# Patient Record
Sex: FEMALE | Race: ASIAN | NOT HISPANIC OR LATINO | URBAN - METROPOLITAN AREA
[De-identification: names, ages, dates, MRNs, and addresses within clinical notes are randomized per-mention and may not be internally consistent; named-entity substitution may affect disease eponyms.]

---

## 2020-07-07 ENCOUNTER — EMERGENCY (EMERGENCY)
Facility: HOSPITAL | Age: 60
LOS: 1 days | Discharge: HOME | End: 2020-07-07
Attending: EMERGENCY MEDICINE
Payer: MEDICAID

## 2020-07-07 VITALS
DIASTOLIC BLOOD PRESSURE: 78 MMHG | SYSTOLIC BLOOD PRESSURE: 136 MMHG | RESPIRATION RATE: 16 BRPM | HEIGHT: 66 IN | TEMPERATURE: 98 F | WEIGHT: 110.01 LBS | HEART RATE: 104 BPM | OXYGEN SATURATION: 97 %

## 2020-07-07 VITALS
DIASTOLIC BLOOD PRESSURE: 86 MMHG | SYSTOLIC BLOOD PRESSURE: 132 MMHG | TEMPERATURE: 99 F | HEART RATE: 99 BPM | OXYGEN SATURATION: 97 % | RESPIRATION RATE: 17 BRPM

## 2020-07-07 LAB
ALBUMIN SERPL ELPH-MCNC: 4.7 G/DL — SIGNIFICANT CHANGE UP (ref 3.3–5)
ALP SERPL-CCNC: 67 U/L — SIGNIFICANT CHANGE UP (ref 40–120)
ALT FLD-CCNC: 22 U/L — SIGNIFICANT CHANGE UP (ref 10–45)
ANION GAP SERPL CALC-SCNC: 14 MMOL/L — SIGNIFICANT CHANGE UP (ref 5–17)
AST SERPL-CCNC: 25 U/L — SIGNIFICANT CHANGE UP (ref 10–40)
BASOPHILS # BLD AUTO: 0.03 K/UL — SIGNIFICANT CHANGE UP (ref 0–0.2)
BASOPHILS NFR BLD AUTO: 0.4 % — SIGNIFICANT CHANGE UP (ref 0–2)
BILIRUB SERPL-MCNC: 0.3 MG/DL — SIGNIFICANT CHANGE UP (ref 0.2–1.2)
BUN SERPL-MCNC: 10 MG/DL — SIGNIFICANT CHANGE UP (ref 7–23)
CALCIUM SERPL-MCNC: 9.8 MG/DL — SIGNIFICANT CHANGE UP (ref 8.4–10.5)
CHLORIDE SERPL-SCNC: 100 MMOL/L — SIGNIFICANT CHANGE UP (ref 96–108)
CO2 SERPL-SCNC: 25 MMOL/L — SIGNIFICANT CHANGE UP (ref 22–31)
CREAT SERPL-MCNC: 0.65 MG/DL — SIGNIFICANT CHANGE UP (ref 0.5–1.3)
EOSINOPHIL # BLD AUTO: 0.01 K/UL — SIGNIFICANT CHANGE UP (ref 0–0.5)
EOSINOPHIL NFR BLD AUTO: 0.1 % — SIGNIFICANT CHANGE UP (ref 0–6)
GLUCOSE SERPL-MCNC: 126 MG/DL — HIGH (ref 70–99)
HCT VFR BLD CALC: 41.5 % — SIGNIFICANT CHANGE UP (ref 34.5–45)
HGB BLD-MCNC: 13.4 G/DL — SIGNIFICANT CHANGE UP (ref 11.5–15.5)
IMM GRANULOCYTES NFR BLD AUTO: 0.4 % — SIGNIFICANT CHANGE UP (ref 0–1.5)
LYMPHOCYTES # BLD AUTO: 1.5 K/UL — SIGNIFICANT CHANGE UP (ref 1–3.3)
LYMPHOCYTES # BLD AUTO: 20.7 % — SIGNIFICANT CHANGE UP (ref 13–44)
MCHC RBC-ENTMCNC: 29.5 PG — SIGNIFICANT CHANGE UP (ref 27–34)
MCHC RBC-ENTMCNC: 32.3 GM/DL — SIGNIFICANT CHANGE UP (ref 32–36)
MCV RBC AUTO: 91.2 FL — SIGNIFICANT CHANGE UP (ref 80–100)
MONOCYTES # BLD AUTO: 0.29 K/UL — SIGNIFICANT CHANGE UP (ref 0–0.9)
MONOCYTES NFR BLD AUTO: 4 % — SIGNIFICANT CHANGE UP (ref 2–14)
NEUTROPHILS # BLD AUTO: 5.38 K/UL — SIGNIFICANT CHANGE UP (ref 1.8–7.4)
NEUTROPHILS NFR BLD AUTO: 74.4 % — SIGNIFICANT CHANGE UP (ref 43–77)
NRBC # BLD: 0 /100 WBCS — SIGNIFICANT CHANGE UP (ref 0–0)
PLATELET # BLD AUTO: 256 K/UL — SIGNIFICANT CHANGE UP (ref 150–400)
POTASSIUM SERPL-MCNC: 3.7 MMOL/L — SIGNIFICANT CHANGE UP (ref 3.5–5.3)
POTASSIUM SERPL-SCNC: 3.7 MMOL/L — SIGNIFICANT CHANGE UP (ref 3.5–5.3)
PROT SERPL-MCNC: 7.9 G/DL — SIGNIFICANT CHANGE UP (ref 6–8.3)
RBC # BLD: 4.55 M/UL — SIGNIFICANT CHANGE UP (ref 3.8–5.2)
RBC # FLD: 12.2 % — SIGNIFICANT CHANGE UP (ref 10.3–14.5)
SODIUM SERPL-SCNC: 139 MMOL/L — SIGNIFICANT CHANGE UP (ref 135–145)
TROPONIN T, HIGH SENSITIVITY RESULT: <6 NG/L — SIGNIFICANT CHANGE UP (ref 0–51)
WBC # BLD: 7.24 K/UL — SIGNIFICANT CHANGE UP (ref 3.8–10.5)
WBC # FLD AUTO: 7.24 K/UL — SIGNIFICANT CHANGE UP (ref 3.8–10.5)

## 2020-07-07 PROCEDURE — 93005 ELECTROCARDIOGRAM TRACING: CPT | Mod: XU

## 2020-07-07 PROCEDURE — 70450 CT HEAD/BRAIN W/O DYE: CPT

## 2020-07-07 PROCEDURE — 84484 ASSAY OF TROPONIN QUANT: CPT

## 2020-07-07 PROCEDURE — 80053 COMPREHEN METABOLIC PANEL: CPT

## 2020-07-07 PROCEDURE — 70450 CT HEAD/BRAIN W/O DYE: CPT | Mod: 26

## 2020-07-07 PROCEDURE — 85027 COMPLETE CBC AUTOMATED: CPT

## 2020-07-07 PROCEDURE — 99285 EMERGENCY DEPT VISIT HI MDM: CPT

## 2020-07-07 PROCEDURE — 99284 EMERGENCY DEPT VISIT MOD MDM: CPT

## 2020-07-07 PROCEDURE — 93010 ELECTROCARDIOGRAM REPORT: CPT

## 2020-07-07 RX ORDER — IBUPROFEN 200 MG
400 TABLET ORAL ONCE
Refills: 0 | Status: COMPLETED | OUTPATIENT
Start: 2020-07-07 | End: 2020-07-07

## 2020-07-07 RX ORDER — ACETAMINOPHEN 500 MG
650 TABLET ORAL ONCE
Refills: 0 | Status: COMPLETED | OUTPATIENT
Start: 2020-07-07 | End: 2020-07-07

## 2020-07-07 RX ADMIN — Medication 650 MILLIGRAM(S): at 20:43

## 2020-07-07 RX ADMIN — Medication 400 MILLIGRAM(S): at 20:43

## 2020-07-07 NOTE — ED ADULT NURSE NOTE - OBJECTIVE STATEMENT
59 year old female presents to ED via walk-in complaining of HA x3 days. PMH  States she has had HA "for a while" saw primary care provider for the HA but symptoms have been worse past 3 days. Also endorses SOB and chest heaviness.   Upon assessment A&O x4 ambulatory with steady gait and well appearing. Describes HA as "pressure" and "feels like I am on an airplane, like my head is going to explode". Gross nuero intact, Denies, changes in vision, numbness and tingling, 59 year old female presents to ED via walk-in complaining of HA x3 days. [place] interpretive services used, pt primarily Syriac speaking. No significant PMH. States she has had HA "for a while" saw primary care provider for the HA, suggested MRI but never had one, but symptoms have been worse past 3 days. Also endorses SOB and chest "heaviness". Upon assessment A&O x4 ambulatory with steady gait and well appearing. Describes HA as "pressure" and "feels like I am on an airplane, like my head is going to explode". Gross neuro intact. Strong x4 extremities. PERRL. Denies changes in vision, numbness and tingling, but states hands and feet "hot sometimes". Denies recent travel, fever, chills, cough, abdominal pain, n/v/d, numbness and tingling at this time. Bed locked and lowered. Comfort and safety measures maintained.

## 2020-07-07 NOTE — ED PROVIDER NOTE - NS ED ROS FT
Gen: Denies fever, weight loss  CV: Denies chest pain, palpitations  Skin: Denies rash, erythema, color changes  Resp: Denies cough + chest discomfort + SOB  Endo: Denies sensitivity to heat, cold, increased urination  GI: Denies constipation, nausea, vomiting  Msk: Denies back pain, LE swelling, extremity pain  : Denies dysuria, increased frequency  Neuro: Denies LOC, weakness, seizures + HA  Psych: Denies hx of psych, hallucinations

## 2020-07-07 NOTE — ED PROVIDER NOTE - CLINICAL SUMMARY MEDICAL DECISION MAKING FREE TEXT BOX
59F w/ no reported PMH p/w 3 days HA 2/2 6 months intermittent frontotemporal HA w/o neck pain or tenderness w/ nl neuro exam, no neck or back pain. Plan for CT to screen for mass effect, will send labs in setting of constant HA, troponin for CP, ekg wnl

## 2020-07-07 NOTE — ED PROVIDER NOTE - PATIENT PORTAL LINK FT
You can access the FollowMyHealth Patient Portal offered by [facility] by registering at the following website: [email or web address]. By joining [employer]’s FollowMyHealth portal, you will also be able to view your health information using other applications (apps) compatible with our system.

## 2020-07-07 NOTE — ED PROVIDER NOTE - NSFOLLOWUPCLINICS_GEN_ALL_ED_FT
[facility]  Neurology  [address] [place] [address] 3rd [place] [address]  Phone: [phone]  Fax:   Follow Up Time: Routine

## 2020-07-07 NOTE — ED PROVIDER NOTE - NSFOLLOWUPINSTRUCTIONS_ED_ALL_ED_FT
Please follow up with your primary care provider for further concerns you may have regarding your general health. Attached you will find your results from today's visit. Continue taking your medications as prescribed and keep your upcoming medical appointments.    Read the attached handout on headaches and how to manage your symptoms at home. Attached please find your results as well as how to reach the Neurology clinic for further follow up regarding your chronic headaches.

## 2020-07-07 NOTE — ED PROVIDER NOTE - OBJECTIVE STATEMENT
59F Sami speaking only no reported pmh p/w headache. Complains of intermittent HA for past 6 months, presenting again over past 3 days. States HA mostly frontotemporal, her PCP has reccomended an MRI but she has not had one. Denies further sxs associated w/ sxs, no changes in vision or worsening w/o glasses, no photophobia, has not tried medications. Also endorsing SOB, chest "heaviness" x1 day. Denies hx intracranial pathology, no prior cancer hx, states hands, feet numb/hot occasionally since menopause but denies neck/back pain.  No recent travel, f/c/n/v/d, abd pain, LE pain.

## 2020-07-07 NOTE — ED PROVIDER NOTE - ATTENDING CONTRIBUTION TO CARE
Patient presenting with multiple complaints.  First is headache ongoing for last 6 months, however worsening over last few days.  Frontotemporal, non radiating, not associated with visual changes, nausea, vomiting, neck stiffness.  No home pain medications for symptoms.  Had discussed this headache in past with PMD who recommended imaging but has not happened.   Also complaining of central, non radiating chest heaviness for last day, non exertional.  Never smoker, no history of cardiac disease.    A 14 point review of systems is negative except as in HPI or otherwise documented.    Exam:  General: Patient well appearing, vital signs within normal limits  HEENT: airway patent with moist mucous membranes  Cardiac: RRR S1/S2 with strong peripheral pulses  Respiratory: lungs clear without respiratory distress  GI: abdomen soft, non tender, non distended  Neuro: no gross neurologic deficits, CN II-XII intact, normal strength, sensation and coordination  Skin: warm, well perfused  Psych: normal mood and affect    Patient presenting with multiple months of headaches with nonfocal neurologic exam also with chest pains but no risk factors other than age - plan for labs, CT head to r/o mass effect, troponin to r/o ACS, treat symptoms and re-evaluate.

## 2020-07-07 NOTE — ED PROVIDER NOTE - PHYSICAL EXAMINATION
Gen: WDWN, NAD  HEENT: EOMI, no nasal discharge, mucous membranes moist  CV: RRR,, no M/R/G  Resp: CTAB, no W/R/R  GI: Abdomen soft non-distended, NTTP, no masses  MSK: No open wounds, no bruising, no LE edema  Neuro: CN2-12 grossly intact, A&Ox4, MS +5/5 in UE and LE BL, gross sensation intact in UE and LE BL negative pronator drift  Psych: appropriate mood

## 2020-07-07 NOTE — ED PROVIDER NOTE - PROGRESS NOTE DETAILS
ct scan neg for acute shift or mass effect, hemorrhage. troponin neg, ekg wnl. Will discuss results w/ pt, already discussed w/ son. will d/c w/ neurology clinic headache specialty f/u.

## 2022-08-15 ENCOUNTER — EMERGENCY (EMERGENCY)
Facility: HOSPITAL | Age: 62
LOS: 1 days | Discharge: HOME | End: 2022-08-15
Payer: MEDICAID

## 2022-08-15 VITALS
SYSTOLIC BLOOD PRESSURE: 149 MMHG | RESPIRATION RATE: 18 BRPM | TEMPERATURE: 98 F | HEIGHT: 66 IN | HEART RATE: 88 BPM | DIASTOLIC BLOOD PRESSURE: 79 MMHG | OXYGEN SATURATION: 99 %

## 2022-08-15 PROCEDURE — 99283 EMERGENCY DEPT VISIT LOW MDM: CPT

## 2022-08-15 RX ORDER — OXYCODONE AND ACETAMINOPHEN 5; 325 MG/1; MG/1
2 TABLET ORAL ONCE
Refills: 0 | Status: DISCONTINUED | OUTPATIENT
Start: 2022-08-15 | End: 2022-08-15

## 2022-08-15 NOTE — ED ADULT TRIAGE NOTE - BRAND OF FIRST COVID-19 BOOSTER
Hypertensive, but reports poor compliance outside of hospital  Restarted home medications:  Amlodipine 10 mg PO daily  Lisinopril 10 mg PO daily  Monitor vital signs on unit   [employer]

## 2022-08-15 NOTE — ED ADULT NURSE NOTE - OBJECTIVE STATEMENT
61y female PMH TMJ x4 years to the ED from home via triage. Pt is c/o of intense jaw pain starting a few days ago and worsening into today. pt reports trouble opening mouth and even talking. Pt is able to tolerate PO intake but little amounts. Pt denies chest pain, palpitations, shortness of breath, headache, visual disturbances, numbness/tingling, fever, chills, diaphoresis,  nausea, vomiting, constipation, diarrhea, or urinary symptoms. Stretcher in lowest position and locked, appropriate side rails in place, room cleared of clutter and safety hazards, call bell in reach- pt oriented to use, blankets given for comfort  Mongolian  #[id number] Nataliia used.

## 2022-08-16 VITALS
RESPIRATION RATE: 17 BRPM | HEART RATE: 72 BPM | SYSTOLIC BLOOD PRESSURE: 128 MMHG | OXYGEN SATURATION: 96 % | DIASTOLIC BLOOD PRESSURE: 79 MMHG

## 2022-08-16 PROCEDURE — 99283 EMERGENCY DEPT VISIT LOW MDM: CPT

## 2022-08-16 RX ADMIN — OXYCODONE AND ACETAMINOPHEN 2 TABLET(S): 5; 325 TABLET ORAL at 00:01

## 2022-08-16 NOTE — ED PROVIDER NOTE - OBJECTIVE STATEMENT
62 yo F pmhx TMJ p/w worsening jaw pain. Bulgarian speaking  [id number] used. pain has been going on for 3 years worse over last week. pt is being followed by out pt providers since 5/26/22, including MRI/MRA brain, MRI cspine, CT Temporal bones (all within the last 2 month), w/o alternative cause of TMJ/headache pain.   Currently taking pregabalin, Baclofen, Zolpidem, duloxetine w/o relief.  No Headache. No photophobia/phonophobia, no neck pain, no weakness/numbness, no ataxia, & no fever/chills.

## 2022-08-16 NOTE — ED PROVIDER NOTE - PHYSICAL EXAMINATION
Gen: NAD, non-toxic appearing  Head: normal appearing  HEENT: normal conjunctiva, oral mucosa moist.  b/l TMJ TTP pt able to speak in full sentences.   Lung: no respiratory distress, CTA b/l     CV: regular rate and rhythm, no murmurs  Abd: soft, non distended, non tender   MSK: no visible deformities  Neuro: No focal deficits, AAOx3  Skin: Warm  Psych: normal affect

## 2022-08-16 NOTE — ED PROVIDER NOTE - ATTENDING CONTRIBUTION TO CARE
MD Umana:  patient seen and evaluated with the resident.  I was present for key portions of the History & Physical, and I agree with the Impression & Plan.  Maori  [id number] "Nataliia"  MD Umana:  62 yo F, c/o bilateral TMJ pain.   Duration: 3yrs; acutely worse the last week.   Context:  aggressive outpatient workup, being followed by dentist since 5/26/22, including MRI/MRA brain, MRI cspine, CT Temporal bones (all within the last 2 month), w/o alternative cause of TMJ/headache pain.    Currently taking pregabalin, Baclofen, Zolpidem, duloxetine w/o relief.  Came to the ED because pain was intolerable.   Associated Sx:  No photophobia/phonophobia, no neck pain, no weakness/numbness, no ataxia, & no fever/chills.    VS:  wnl.  Physical Exam: adult F, mild distress, NCAT, PERRL, EOMI, Neck - supple, CTA B, RRR, Abd: s/nd/nt, Ext: no edema.  Neuro:  AAOx3, ambulates w/o diff, CN2-12 intact, Gait - normal, normal finger-to-nose, normal heel-to-toe.    Impression:  Acute on chronic exacerbation of TMJ syndrome.  Patient has already been worked up extensively for this condition with advanced imaging.  Further ED workup not needed at this time.  I do not suspect SAH, aneurysm, dural sinus thrombosis, or meningoencephalitis because the quality, modifiers, risk factors, and physical exam are inconsistent with these diagnoses at this time; advanced imaging with CT &/or MRI are not indicated at this time.    Plan:  Pain control; f/u outpatient Dental/OMFS. MD Umana:  patient seen and evaluated with the resident.  I was present for key portions of the History & Physical, and I agree with the Impression & Plan.  Italian  [id number] "Nataliia"  MD Umana:  60 yo F, c/o bilateral TMJ pain.   Duration: 3yrs; acutely worse the last week.   Context:  aggressive outpatient workup, being followed by dentist since 5/26/22, including MRI/MRA brain, MRI cspine, CT Temporal bones (all within the last 2 month), w/o alternative cause of TMJ/headache pain.    Currently taking pregabalin, Baclofen, Zolpidem, duloxetine w/o relief.  Came to the ED because pain was intolerable.   Associated Sx:  No photophobia/phonophobia, no neck pain, no weakness/numbness, no ataxia, & no fever/chills.    VS:  wnl.  Physical Exam: adult F, mild distress, NCAT, PERRL, EOMI, Neck - supple, CTA B, RRR, Abd: s/nd/nt, Ext: no edema.  Neuro:  AAOx3, ambulates w/o diff, CN2-12 intact, Gait - normal, normal finger-to-nose, normal heel-to-toe..    Impression:  Acute on chronic exacerbation of TMJ syndrome.  Patient has already been worked up extensively for this condition with advanced imaging.  Further ED workup not needed at this time.  I do not suspect SAH, aneurysm, dural sinus thrombosis, or meningoencephalitis because the quality, modifiers, risk factors, and physical exam are inconsistent with these diagnoses at this time; advanced imaging with CT &/or MRI are not indicated at this time.    Plan:  Pain control; f/u outpatient Dental/OMFS.

## 2022-08-16 NOTE — ED PROVIDER NOTE - NSFOLLOWUPCLINICS_GEN_ALL_ED_FT
Oral & Maxillofacial Surgery  Department of Dental Medicine  [phone] [place] [address]  Phone: [phone]  Fax: [phone]

## 2022-08-16 NOTE — ED PROVIDER NOTE - NSFOLLOWUPINSTRUCTIONS_ED_ALL_ED_FT
-Please follow up with   Oral & Maxillofacial Surgery  Department of Dental Medicine  [phone] [place] [address]  Phone: [phone]  Fax: [phone]    -Please take Tylenol up to 650 mg every 6 hours as needed for pain and/or Motrin up to 600 mg every 8 hours as needed for pain    -Please take any prescribed medications as instructed by your PMD    - Be sure to return to the ED if you develop new or worsening symptoms. Specific signs and symptoms to be vigilant of: fever or chills, chest pain, difficulty breathing, palpitations, loss of consciousness, headache, vision changes, slurred speech, difficulty swallowing or drooling, facial droop, weakness in the arms or legs, numbness or tingling, abdominal pain, nausea or vomiting, diarrhea, constipation, blood in the stool or urine, pain on urination, difficulty urinating.

## 2022-08-16 NOTE — ED PROVIDER NOTE - PATIENT PORTAL LINK FT
You can access the FollowMyHealth Patient Portal offered by [facility] by registering at the following website: [email or web address]. By joining [employer]’s FollowMyHealth portal, you will also be able to view your health information using other applications (apps) compatible with our system.

## 2025-03-17 NOTE — ED ADULT NURSE NOTE - NS_NURSE_DISC_TEACHING_YN_ED_ALL_ED
[facility] ACL Lab reports patient is there right now and Dr. Kohli didn't place a urine order. Can one of you please call them back? Lab person's name is Preeti, number is [phone] for the ACL Lab :)     Called [facility]'s ACL lab and spoke with Preeti who noted patient did not specify the name of the urine test patient was referring to.    Called patient, left message to return call - voice mail was not working.    E-advice sent to patient   Yes